# Patient Record
Sex: MALE | Race: BLACK OR AFRICAN AMERICAN | Employment: FULL TIME | URBAN - METROPOLITAN AREA
[De-identification: names, ages, dates, MRNs, and addresses within clinical notes are randomized per-mention and may not be internally consistent; named-entity substitution may affect disease eponyms.]

---

## 2017-02-13 ENCOUNTER — APPOINTMENT (OUTPATIENT)
Dept: PHYSICAL THERAPY | Age: 42
End: 2017-02-13
Payer: COMMERCIAL

## 2017-02-17 ENCOUNTER — HOSPITAL ENCOUNTER (OUTPATIENT)
Dept: PHYSICAL THERAPY | Age: 42
Discharge: HOME | End: 2017-02-17
Payer: COMMERCIAL

## 2017-02-17 PROCEDURE — 97140 MANUAL THERAPY 1/> REGIONS: CPT

## 2017-02-17 PROCEDURE — 97162 PT EVAL MOD COMPLEX 30 MIN: CPT

## 2017-02-17 NOTE — PROGRESS NOTES
Daniel Matute [address]  [facility] PHYSICAL THERAPY AT [facility] [address], [address], [address] - Phone: [phone]  Fax: [phone] / [phone] [facility]  Patient Name: Shani Davis : 1975   Medical   Diagnosis: Low back pain [M54.5] Treatment Diagnosis: LBP   Onset Date:      Referral Source: Liliana Ernst NP Start of Care [facility]): 2017   Prior Hospitalization: See medical history Provider #: [id number]   Prior Level of Function: Hx of LBP   Comorbidities: No significant PMH   Medications: Verified on Patient Summary List   The Plan of Care and following information is based on the information from the initial evaluation.   ==================================================================================  Assessment / key information: Patient  is a 35 y.o. male who presents to [facility] at [facility] with diagnosis of Low back pain [M54.5]. Patient reports chronic hx of LBP began  with insidious onset. Pain is located in left posterior hip and described as constant sharp pain. Pt notes infrequent numbness and tingling radiating down the L LE. Pain level is rated at 2/10 at the best, 3/10 currently, and 10/10 at the worst. LBP increases with prolonged standing and ambulation, decreases with sitting. Upon objective evaluation, patient demonstrates left SI hypomobility, impaired and painful trunk AROM in all directions, decreased core and multifidus strength, and impaired flexibility of ROSANGELA piriformis and hamstring musculature. Patient scored 61 on FOTO indicating decreased functional status and quality of life. Patient can benefit from skilled PT interventions to improve L/S ROM, flexibility, core strength, decrease pain and TTP and for education on posture, body mechanics and lifting mechanics/transfers to facilitate ADL's & overall functional status/quality of life. ==================================================================================  Problem List: pain affecting function, decrease ROM, decrease strength, edema affecting function, impaired gait/ balance, decrease ADL/ functional abilities, decrease activity tolerance, decrease flexibility/ joint mobility and decrease transfer abilities   Treatment Plan may include any combination of the following: Therapeutic exercise, Therapeutic activities, Neuromuscular re-education, Physical agent/modality, dry needling, Gait/balance training, Manual therapy and Patient education  Patient / Family readiness to learn indicated by: asking questions, trying to perform skills and interest  Persons(s) to be included in education: patient (P)  Barriers to Learning/Limitations: no  Measures taken:    Patient Goal (s): \"pain management and ease the pain\"   Patient self reported health status: excellent  Rehabilitation Potential: good   Short Term Goals: To be accomplished in 2  weeks:  1) Establish HEP. 2) Patient will report decreased c/o pain to < or = 7/10 at the worst to facilitate prolonged standing with manageable sx in L/S.  3) Patient will report 25% improvement in L LE radicular symptoms in order to facilitate ease with prolonged sitting.  Long Term Goals: To be accomplished in 4 weeks:  1) Patient independent  with HEP and able to demo proper body mechanics for floor to chest lifting. 2) Patient will increase L/S ROM in flexion to lower shin to increase ability to perform household chores. 3) Increase FOTO to 70 indicating improved function and quality of life.     Frequency / Duration:   Patient to be seen  1  times per week for 4-6  weeks:  Patient / Caregiver education and instruction: self care, activity modification and exercises  G-Codes (GP): n/a  Eval Complexity: History: MEDIUM  Complexity : 1-2 comorbidities / personal factors will impact the outcome/ POC Exam:HIGH Complexity : 4+ Standardized tests and measures addressing body structure, function, activity limitation and / or participation in recreation  Presentation: MEDIUM Complexity : Evolving with changing characteristics  Clinical Decision Making:MEDIUM Complexity : FOTO score of 26-74Overall Complexity:MEDIUM    Therapist Signature: Lolly Rodriguez Date: 7/94/8195   Certification Period: n/a Time: 8:41 AM   ==================================================================================  I certify that the above Physical Therapy Services are being furnished while the patient is under my care. I agree with the treatment plan and certify that this therapy is necessary. Physician Signature:        Date:       Time:     Please sign and return to [facility] or you may fax the signed copy to [phone]. Thank you.

## 2017-02-17 NOTE — PROGRESS NOTES
PHYSICAL THERAPY - DAILY TREATMENT NOTE    Patient Name: Sunny Meyer        Date: 2017  : 1975   YES Patient  Verified  Visit #:   1   of   4-6  Insurance: Payor: BLUE CROSS / Plan: [facility] PPO / Product Type: PPO /      In time: 9:07am Out time: 9:47am   Total Treatment Time: 40     Medicare Time Tracking (below)   Total Timed Codes (min):  n/a 1:1 Treatment Time:  n/a     TREATMENT AREA =  Low back pain [M54.5]  SUBJECTIVE  Pain Level (on 0 to 10 scale):  2-3  / 10   Medication Changes/New allergies or changes in medical history, any new surgeries or procedures? NO    If yes, update Summary List   Subjective Functional Status/Changes:  []  No changes reported     Pt states \"my back pain started when i was in the [employer] and heard a pop and then has been on again off again, most recently started in December where it got really bad\"         OBJECTIVE  Modalities Rationale:   PD     5 NB min Therapeutic Exercise:  [x]  See flow sheet   Rationale:      increase ROM and increase strength to improve the patients ability to tolerate prolonged standing. 17 min Manual Therapy: STM to ROSANGELA upper lumbar and lower thoracic paraspinals and ROSANGELA QL (L>R), sustained pressure to ROSANGELA piriformis    Rationale:      decrease pain, increase ROM and increase tissue extensibility in L/S to improve patient's ability to perform ADLs     min Patient Education:  YES  Reviewed HEP   []  Progressed/Changed HEP based on:         Other Objective/Functional Measures:     Justification for Eval Complexity:   Patient History: MEDIUM  Complexity : 1-2 comorbidities / personal factors will impact the outcome/ POC  (Chronic and financial)  Examination:HIGH Complexity : 4+ Standardized tests and measures addressing body structure, function, activity limitation and / or participation in recreation  (See POC and musculoskeletal examination attached)  Clinical Presentation: MEDIUM Complexity : Evolving with changing characteristics  (pain level 5/10 on average and 10/10 @ worst, intermittent N/T, constant pain)  Clinical Decision Making:MEDIUM Complexity : FOTO score of 26-74 (Foto 59/100)  Overall Complexity:MEDIUM     Post Treatment Pain Level (on 0 to 10) scale:   2  / 10     ASSESSMENT  Assessment/Changes in Function:     Pt presented to PT services with decreased L/S ROM, strength, flexibility as well as increased tone and pain consistent with piriformis syndrome and would benefit from PT services in order to address the above impairments. []  See Progress Note/Recertification   Patient will continue to benefit from skilled PT services to modify and progress therapeutic interventions, address functional mobility deficits, address ROM deficits, address strength deficits, analyze and address soft tissue restrictions, analyze and cue movement patterns, analyze and modify body mechanics/ergonomics and assess and modify postural abnormalities to attain remaining goals. Progress toward goals / Updated goals:    Progressing towards newly established goals.       PLAN  [x]  Upgrade activities as tolerated YES Continue plan of care   []  Discharge due to :    []  Other:      Therapist: Sharyn Garcia DPT     Date: 2/17/2017 Time: 8:41 AM     Future Appointments  Date Time Provider Lee Jiménez   2/17/2017 9:00 AM [address]

## 2017-03-21 NOTE — PROGRESS NOTES
[place] [facility] AT [facility] [address], [address] [facility] [address] [facility] [facility], [facility] [address]  Phone: [phone]  Fax: [phone] SUMMARY  Patient Name: Mitzy Vences : 1975   Treatment/Medical Diagnosis: Low back pain [M54.5]   Referral Source: Dav Roberts NP     Date of Initial Visit: 17 Attended Visits: 1 Missed Visits: 0     SUMMARY OF TREATMENT  Therapeutic exercises including ROM, strengthening, stretching, manual therapy including joint and soft tissue manipulation, balance training, postural re-education, HEP instruction. CURRENT STATUS  Pt self discharge from PT services on 2017 secondary to high copayment. Goal/Measure of Progress Goal Met? 1.  Establish HEP. Status at last Eval: Established  Current Status: HEP Established yes   2. Patient will report decreased c/o pain to < or = 7/10 at the worst to facilitate prolonged standing with manageable sx in L/S. Status at last Eval: 10/10 @ worst Current Status: Unable to be assessed  no   3. Patient will report 25% improvement in L LE radicular symptoms in order to facilitate ease with prolonged sitting. Status at last Eval: Goal Established Current Status: Unable to be assessed no     RECOMMENDATIONS  Other: High Copay  If you have any questions/comments please contact us directly at [phone]. Thank you for allowing us to assist in the care of your patient.     Therapist Signature: Mode James Date: 2017     Time: 11:47 AM

## 2023-05-29 SDOH — HEALTH STABILITY: PHYSICAL HEALTH: ON AVERAGE, HOW MANY DAYS PER WEEK DO YOU ENGAGE IN MODERATE TO STRENUOUS EXERCISE (LIKE A BRISK WALK)?: 0 DAYS

## 2023-05-29 SDOH — HEALTH STABILITY: PHYSICAL HEALTH: ON AVERAGE, HOW MANY MINUTES DO YOU ENGAGE IN EXERCISE AT THIS LEVEL?: 0 MIN

## 2023-07-09 SDOH — HEALTH STABILITY: PHYSICAL HEALTH: ON AVERAGE, HOW MANY DAYS PER WEEK DO YOU ENGAGE IN MODERATE TO STRENUOUS EXERCISE (LIKE A BRISK WALK)?: 0 DAYS

## 2023-07-09 SDOH — HEALTH STABILITY: PHYSICAL HEALTH: ON AVERAGE, HOW MANY MINUTES DO YOU ENGAGE IN EXERCISE AT THIS LEVEL?: 0 MIN

## 2023-07-11 NOTE — PROGRESS NOTES
[facility]  [address] [place] [address], [address] [facility], [address] [place] [address]   Phone: [phone]  Fax: [phone]    NEW PATIENT  Pt's YOB: 1975    ASSESSMENT   Virgilio Bunn was seen today for back pain and knee pain. Diagnoses and all orders for this visit:    Lumbar radiculitis  -     External Referral To Physical Therapy  -     gabapentin (NEURONTIN) 300 MG capsule; Take 1 in the morning and 2 in the evening as directed  -     DME Order for (Specify) as OP    Lumbar facet arthropathy  -     External Referral To Physical Therapy  -     DME Order for (Specify) as OP    DDD (degenerative disc disease), lumbar  -     External Referral To Physical Therapy  -     DME Order for (Specify) as OP    Chronic pain of left knee  -     diclofenac sodium (VOLTAREN) 1 % GEL; Apply 4 g topically 4 times daily         IMPRESSION AND PLAN:  Cristela Bhakta is a 46 y.o.  hand dominant male with history of lumbar pain x . Pt presents to the office today as a new patient referred by DEEPTI Vazquez. Pt complains of lumbar pain with radicular symptoms into the left lower extremity, numbness and tingling in his left lower extremity, left knee pain, sharp pain in the bilateral hip regions and left leg weakness, progressively worse over the years. [facility]. Pt states he attended physical therapy in the past but d/c due to the cost. He denies taking medication for the radicular symptoms into his left lower extremity. He is taking Elavil at night to help him sleep, ibuprofen for inflammation and will start Neurontin 300 mg 1 in the morning and 2 in the evening for neuropathic symptoms and Voltaren 1% gel. 1) Pt was given information on core strengthening and low back arthritis  exercises. 2) Discussed treatment options with the patient including physical therapy and medication.    3) Pt was referred to lumbar physical

## 2023-07-12 ENCOUNTER — OFFICE VISIT (OUTPATIENT)
Age: 48
End: 2023-07-12
Payer: COMMERCIAL

## 2023-07-12 VITALS
BODY MASS INDEX: 25.33 KG/M2 | HEART RATE: 82 BPM | HEIGHT: 76 IN | OXYGEN SATURATION: 98 % | TEMPERATURE: 97.8 F | WEIGHT: 208 LBS

## 2023-07-12 DIAGNOSIS — M54.16 LUMBAR RADICULITIS: Primary | ICD-10-CM

## 2023-07-12 DIAGNOSIS — M51.36 DDD (DEGENERATIVE DISC DISEASE), LUMBAR: ICD-10-CM

## 2023-07-12 DIAGNOSIS — M25.562 CHRONIC PAIN OF LEFT KNEE: ICD-10-CM

## 2023-07-12 DIAGNOSIS — G89.29 CHRONIC PAIN OF LEFT KNEE: ICD-10-CM

## 2023-07-12 DIAGNOSIS — M47.816 LUMBAR FACET ARTHROPATHY: ICD-10-CM

## 2023-07-12 PROCEDURE — 99203 OFFICE O/P NEW LOW 30 MIN: CPT | Performed by: PHYSICAL MEDICINE & REHABILITATION

## 2023-07-12 RX ORDER — CELECOXIB 200 MG/1
200 CAPSULE ORAL 2 TIMES DAILY PRN
COMMUNITY

## 2023-07-12 RX ORDER — OMEPRAZOLE 40 MG/1
CAPSULE, DELAYED RELEASE ORAL DAILY
COMMUNITY
Start: 2023-07-08

## 2023-07-12 RX ORDER — SUMATRIPTAN 20 MG/1
1 SPRAY NASAL DAILY PRN
COMMUNITY

## 2023-07-12 RX ORDER — ELETRIPTAN HYDROBROMIDE 40 MG/1
40 TABLET, FILM COATED ORAL
COMMUNITY

## 2023-07-12 RX ORDER — FEXOFENADINE HCL 180 MG/1
180 TABLET ORAL DAILY PRN
COMMUNITY

## 2023-07-12 RX ORDER — IBUPROFEN 800 MG/1
800 TABLET ORAL DAILY PRN
COMMUNITY

## 2023-07-12 RX ORDER — CYCLOBENZAPRINE HCL 10 MG
10 TABLET ORAL DAILY PRN
COMMUNITY

## 2023-07-12 RX ORDER — AMLODIPINE BESYLATE 10 MG/1
10 TABLET ORAL DAILY
COMMUNITY
Start: 2023-07-08

## 2023-07-12 RX ORDER — GABAPENTIN 300 MG/1
CAPSULE ORAL
Qty: 90 CAPSULE | Refills: 1 | Status: SHIPPED | OUTPATIENT
Start: 2023-07-12 | End: 2023-09-20

## 2023-07-12 RX ORDER — AMITRIPTYLINE HYDROCHLORIDE 25 MG/1
25 TABLET, FILM COATED ORAL NIGHTLY PRN
COMMUNITY

## 2023-07-12 NOTE — PROGRESS NOTES
Jorge Monge presents today for   Chief Complaint   Patient presents with    Back Pain    Knee Pain     Bilateral          Is someone accompanying this pt? no    Is the patient using any DME equipment during OV? no      Coordination of Care:  1. Have you been to the ER, urgent care clinic since your last visit? no  Hospitalized since your last visit? no    2. Have you seen or consulted any other health care providers outside of the [address] [place] [address] Avenue since your last visit? Yes, pcp, sleep specialist  Include any pap smears or colon screening.  no

## 2023-09-11 NOTE — PROGRESS NOTES
[facility]  [facility] [address] [place] [address], [address] [facility], [address] [place] [address] Dr  Phone: [phone]  Fax: [phone]    Pt's YOB: 1975    ASSESSMENT   Mary Dillard was seen today for back problem, pain and lower back pain. Diagnoses and all orders for this visit:    Lumbar radiculitis  -     MRI LUMBAR SPINE WO CONTRAST; Future    Lumbar facet arthropathy  -     MRI LUMBAR SPINE WO CONTRAST; Future    DDD (degenerative disc disease), lumbar  -     MRI LUMBAR SPINE WO CONTRAST; Future    Chronic pain of left knee  -     [facility] - Binh Blum, DO, Orthopedic Surgery(General/Total Joint), [place] ([address])    Gait abnormality  -     MRI LUMBAR SPINE WO CONTRAST; Future  -     [facility] - Kulwant, Bemadinmin, DO, Orthopedic Surgery(General/Total Joint), [place] ([address])         IMPRESSION AND PLAN:  Eleanor Conklin is a 46 y.o. male with history of lumbar pain and presents to the office today for PT and medication follow up. Pt continues to complain of lumbar pain with radicular symptoms into both legs (L>R), numbness and tingling in the left leg, left knee pain, sharp pain in the bilateral hip regions and left leg weakness, minimally improved since his last office visit. Pt reports he has not undergone physical therapy yet but has been doing HEP with minimal benefit; he is scheduled to discuss this and his MRI with his PCP, OMERO Navarrete-NP. Pt reports taking Neurontin and using a back brace with improvement. He has a HEP of exercises received from his last office visit. He is taking Neurontin 300 mg 1 in the morning and 2 in the evening for neuropathic symptoms, uses Voltaren 1% gel for inflammatory symptoms and remains on Elavil. 1) Pt was given information on knee and low back arthritis exercises. 2) Lumbar spine MRI was ordered to assess for spinal stenosis, impingement and inflammation.    3) Pt was referred to Dr. Guicho Boyer MD

## 2023-09-12 ENCOUNTER — OFFICE VISIT (OUTPATIENT)
Age: 48
End: 2023-09-12
Payer: COMMERCIAL

## 2023-09-12 VITALS
OXYGEN SATURATION: 98 % | RESPIRATION RATE: 18 BRPM | BODY MASS INDEX: 24.31 KG/M2 | SYSTOLIC BLOOD PRESSURE: 124 MMHG | TEMPERATURE: 97.6 F | HEIGHT: 76 IN | DIASTOLIC BLOOD PRESSURE: 75 MMHG | HEART RATE: 79 BPM | WEIGHT: 199.6 LBS

## 2023-09-12 DIAGNOSIS — M47.816 LUMBAR FACET ARTHROPATHY: ICD-10-CM

## 2023-09-12 DIAGNOSIS — M25.562 CHRONIC PAIN OF LEFT KNEE: ICD-10-CM

## 2023-09-12 DIAGNOSIS — G89.29 CHRONIC PAIN OF LEFT KNEE: ICD-10-CM

## 2023-09-12 DIAGNOSIS — M51.36 DDD (DEGENERATIVE DISC DISEASE), LUMBAR: ICD-10-CM

## 2023-09-12 DIAGNOSIS — R26.9 GAIT ABNORMALITY: ICD-10-CM

## 2023-09-12 DIAGNOSIS — M54.16 LUMBAR RADICULITIS: Primary | ICD-10-CM

## 2023-09-12 PROCEDURE — 99214 OFFICE O/P EST MOD 30 MIN: CPT | Performed by: PHYSICAL MEDICINE & REHABILITATION

## 2023-09-12 RX ORDER — TADALAFIL 5 MG/1
TABLET ORAL
COMMUNITY
Start: 2023-07-27

## 2023-09-12 NOTE — PROGRESS NOTES
Meli Blackburn presents today for   Chief Complaint   Patient presents with    Back Problem    Pain    Lower Back Pain       Is someone accompanying this pt? no    Is the patient using any DME equipment during OV? no    Depression Screening:       No data to display                Learning Assessment:  No flowsheet data found. Abuse Screening:       No data to display                Fall Risk  No flowsheet data found. OPIOID RISK TOOL  No flowsheet data found. Coordination of Care:  1. Have you been to the ER, urgent care clinic since your last visit? non  Hospitalized since your last visit? o    2. Have you seen or consulted any other health care providers outside of the [address] [place] [address] since your last visit? no Include any pap smears or colon screening.  no

## 2023-10-04 ENCOUNTER — HOSPITAL ENCOUNTER (OUTPATIENT)
Facility: HOSPITAL | Age: 48
Discharge: HOME | End: 2023-10-07
Attending: PHYSICAL MEDICINE & REHABILITATION
Payer: COMMERCIAL

## 2023-10-04 DIAGNOSIS — M47.816 LUMBAR FACET ARTHROPATHY: ICD-10-CM

## 2023-10-04 DIAGNOSIS — M54.16 LUMBAR RADICULITIS: ICD-10-CM

## 2023-10-04 DIAGNOSIS — R26.9 GAIT ABNORMALITY: ICD-10-CM

## 2023-10-04 DIAGNOSIS — M51.36 DDD (DEGENERATIVE DISC DISEASE), LUMBAR: ICD-10-CM

## 2023-10-04 PROCEDURE — 72148 MRI LUMBAR SPINE W/O DYE: CPT

## 2023-10-09 SDOH — HEALTH STABILITY: PHYSICAL HEALTH: ON AVERAGE, HOW MANY MINUTES DO YOU ENGAGE IN EXERCISE AT THIS LEVEL?: 0 MIN

## 2023-10-09 SDOH — HEALTH STABILITY: PHYSICAL HEALTH: ON AVERAGE, HOW MANY DAYS PER WEEK DO YOU ENGAGE IN MODERATE TO STRENUOUS EXERCISE (LIKE A BRISK WALK)?: 0 DAYS

## 2023-10-09 ASSESSMENT — SOCIAL DETERMINANTS OF HEALTH (SDOH)
WITHIN THE LAST YEAR, HAVE YOU BEEN HUMILIATED OR EMOTIONALLY ABUSED IN OTHER WAYS BY YOUR PARTNER OR EX-PARTNER?: NO
WITHIN THE LAST YEAR, HAVE YOU BEEN AFRAID OF YOUR PARTNER OR EX-PARTNER?: NO
WITHIN THE LAST YEAR, HAVE TO BEEN RAPED OR FORCED TO HAVE ANY KIND OF SEXUAL ACTIVITY BY YOUR PARTNER OR EX-PARTNER?: NO
WITHIN THE LAST YEAR, HAVE YOU BEEN KICKED, HIT, SLAPPED, OR OTHERWISE PHYSICALLY HURT BY YOUR PARTNER OR EX-PARTNER?: NO

## 2023-10-11 SDOH — HEALTH STABILITY: PHYSICAL HEALTH: ON AVERAGE, HOW MANY MINUTES DO YOU ENGAGE IN EXERCISE AT THIS LEVEL?: 0 MIN

## 2023-10-11 SDOH — HEALTH STABILITY: PHYSICAL HEALTH: ON AVERAGE, HOW MANY DAYS PER WEEK DO YOU ENGAGE IN MODERATE TO STRENUOUS EXERCISE (LIKE A BRISK WALK)?: 0 DAYS

## 2023-10-12 ENCOUNTER — OFFICE VISIT (OUTPATIENT)
Age: 48
End: 2023-10-12

## 2023-10-12 VITALS — BODY MASS INDEX: 24.99 KG/M2 | HEIGHT: 76 IN | WEIGHT: 205.2 LBS | RESPIRATION RATE: 16 BRPM

## 2023-10-12 DIAGNOSIS — G89.29 CHRONIC PAIN OF BOTH KNEES: Primary | ICD-10-CM

## 2023-10-12 DIAGNOSIS — M25.562 CHRONIC PAIN OF BOTH KNEES: Primary | ICD-10-CM

## 2023-10-12 DIAGNOSIS — M25.561 CHRONIC PAIN OF BOTH KNEES: Primary | ICD-10-CM

## 2023-10-12 DIAGNOSIS — M54.50 CHRONIC BILATERAL LOW BACK PAIN, UNSPECIFIED WHETHER SCIATICA PRESENT: ICD-10-CM

## 2023-10-12 DIAGNOSIS — G89.29 CHRONIC BILATERAL LOW BACK PAIN, UNSPECIFIED WHETHER SCIATICA PRESENT: ICD-10-CM

## 2023-10-12 RX ORDER — ACETAMINOPHEN 500 MG
1000 TABLET ORAL EVERY 8 HOURS PRN
Qty: 180 TABLET | Refills: 2 | Status: SHIPPED | OUTPATIENT
Start: 2023-10-12 | End: 2024-01-10

## 2023-10-12 RX ORDER — MELOXICAM 15 MG/1
15 TABLET ORAL DAILY
Qty: 30 TABLET | Refills: 2 | Status: SHIPPED | OUTPATIENT
Start: 2023-10-12 | End: 2024-01-10

## 2023-10-20 DIAGNOSIS — M54.16 LUMBAR RADICULITIS: ICD-10-CM

## 2023-10-20 RX ORDER — GABAPENTIN 300 MG/1
CAPSULE ORAL
Qty: 90 CAPSULE | Refills: 1 | Status: SHIPPED | OUTPATIENT
Start: 2023-10-20 | End: 2023-12-19